# Patient Record
(demographics unavailable — no encounter records)

---

## 2024-12-18 NOTE — HISTORY OF PRESENT ILLNESS
[FreeTextEntry1] : LIA KUMAR  is being evaluated at the request of [facility] for an opinion re: lower abdominal pain ( L> R). Gives a h/o diverticulitis prior to colonocopy by Dr. Layton in 2022. States that current sxs have been present for a few weeks, worse last week, but still present today.  Denies naiusea, vomiting, fever, chills, melena, hematemesis.  Admits to post prandial diarrhea. Denies BRBPR.  Has no upper GI sxs..but states that spicey foods / fatty foods increase her lower abdominal pain. Feels " better " with Pepcid  -Colonoscopy ( [facility] 4/2022) diverticulosis. Follow up recommended in 7 years

## 2024-12-18 NOTE — PHYSICAL EXAM
[Alert] : alert [Sclera] : the sclera and conjunctiva were normal [Normal Appearance] : the appearance of the neck was normal [No Respiratory Distress] : no respiratory distress [Abnormal Walk] : normal gait [Normal Color / Pigmentation] : normal skin color and pigmentation [Cranial Nerves Intact] : cranial nerves 2-12 were intact [Oriented To Time, Place, And Person] : oriented to person, place, and time [de-identified] : LLQ tenderness to palpation with voluntary guarding

## 2024-12-18 NOTE — PHYSICAL EXAM
[Alert] : alert [Sclera] : the sclera and conjunctiva were normal [Normal Appearance] : the appearance of the neck was normal [No Respiratory Distress] : no respiratory distress [Abnormal Walk] : normal gait [Normal Color / Pigmentation] : normal skin color and pigmentation [Cranial Nerves Intact] : cranial nerves 2-12 were intact [Oriented To Time, Place, And Person] : oriented to person, place, and time [de-identified] : LLQ tenderness to palpation with voluntary guarding

## 2024-12-18 NOTE — ASSESSMENT
[FreeTextEntry1] : LLQ pain..etiology unclear. Presentation not classic for diverticulitis..but pain / tenderness  in primarily LLQ . Recommended ED evaluation now  for CT scan to exclude diverticulitis ( since outpatient elective CT scan could takes weeks). Patient states that she has  issues and can not go today , but will go tomorrow.  Will go today if pain increases, develops fever  The ordered labs/ bloods  were drawn / collected in my office Plans pending ED evaluation and labs / CT scan

## 2025-01-06 NOTE — ASSESSMENT
[FreeTextEntry1] : Etiology of LLQ pain was likely cystitis seen on CT scan and also suggested by UTI.  Follow up as needed No GI issues at present  Pertinent available records reviewed

## 2025-01-06 NOTE — PHYSICAL EXAM
[Alert] : alert [Sclera] : the sclera and conjunctiva were normal [Normal Appearance] : the appearance of the neck was normal [No Respiratory Distress] : no respiratory distress [Abnormal Walk] : normal gait [Normal Color / Pigmentation] : normal skin color and pigmentation [Cranial Nerves Intact] : cranial nerves 2-12 were intact [Oriented To Time, Place, And Person] : oriented to person, place, and time [de-identified] : LLQ tenderness to palpation with voluntary guarding [Abdomen Soft] : soft

## 2025-01-06 NOTE — PHYSICAL EXAM
[Alert] : alert [Sclera] : the sclera and conjunctiva were normal [Normal Appearance] : the appearance of the neck was normal [No Respiratory Distress] : no respiratory distress [Abnormal Walk] : normal gait [Normal Color / Pigmentation] : normal skin color and pigmentation [Cranial Nerves Intact] : cranial nerves 2-12 were intact [Oriented To Time, Place, And Person] : oriented to person, place, and time [de-identified] : LLQ tenderness to palpation with voluntary guarding [Abdomen Soft] : soft

## 2025-01-06 NOTE — HISTORY OF PRESENT ILLNESS
[FreeTextEntry1] : Presents for f/u. Feels better  Denies nausea, vomiting, fever, chills, diarrhea, constipation, melena, hematemesis  Evaluated  12/18/24  re: lower abdominal pain ( L> R). Gives a h/o diverticulitis prior to colonoscopy by Dr. Layton in 2022. Stated that current sxs had been present for a few weeks, worse  week prior to office visit.  Denied nausea, vomiting, fever, chills, melena, hematemesis.  Admitted to post prandial diarrhea. Denied BRBPR.  Had no upper GI sxs..but stated that spicey foods / fatty foods increase her lower abdominal pain. Feels " better " with Pepcid  Recommended ED evaluation at that visit for CT scan to exclude diverticulitis ( since outpatient elective CT scan could takes weeks). Patient stated that she had  issues and could not go on that day , but would go in am. Labs done in office were unremarkable  ED evaluation on 12/19/24 was notable for ? cystitits with UTI..Treated with Keflex.  CT ascan: diverticulosis withouyt evidence of Diverticulitis. Bladsder wall thickening   -Colonoscopy ( [facility] 4/2022) diverticulosis. Follow up recommended in 7 years